# Patient Record
Sex: FEMALE
[De-identification: names, ages, dates, MRNs, and addresses within clinical notes are randomized per-mention and may not be internally consistent; named-entity substitution may affect disease eponyms.]

---

## 2019-04-23 ENCOUNTER — HOSPITAL ENCOUNTER (OUTPATIENT)
Dept: HOSPITAL 5 - SPVWC | Age: 74
Discharge: HOME | End: 2019-04-23
Attending: FAMILY MEDICINE
Payer: MEDICARE

## 2019-04-23 DIAGNOSIS — Z12.31: Primary | ICD-10-CM

## 2019-04-23 PROCEDURE — 77067 SCR MAMMO BI INCL CAD: CPT

## 2019-04-23 NOTE — MAMMOGRAPHY REPORT
BILATERAL DIGITAL SCREENING MAMMOGRAM with CAD: 04/23/19 13:30:00



CLINICAL: Routine screening.



COMPARISON: 10/17/13



FINDINGS: There are bilateral scattered areas of fibroglandular 

density.No mass, architectural distortion or suspicious calcifications.



IMPRESSION: No mammographic evidence of malignancy.



BI-RADS CATEGORY:  1 -- Negative



RECOMMENDATION: Routine mammographic screening in one year.



COMMENT:

Patient follow-up letters are generated by our [employer] application.

## 2021-01-05 ENCOUNTER — HOSPITAL ENCOUNTER (OUTPATIENT)
Dept: HOSPITAL 5 - SPVWC | Age: 76
Discharge: HOME | End: 2021-01-05
Attending: FAMILY MEDICINE
Payer: MEDICARE

## 2021-01-05 DIAGNOSIS — Z12.31: Primary | ICD-10-CM

## 2021-01-05 PROCEDURE — 77067 SCR MAMMO BI INCL CAD: CPT

## 2021-01-05 NOTE — MAMMOGRAPHY REPORT
DIGITAL SCREENING MAMMOGRAM WITH CAD, 1/5/2021



CLINICAL INFORMATION / INDICATION: Routine screening mammography. SCREENING MAMMOGRAM



TECHNIQUE:  Digital bilateral 2D mammography was obtained in the craniocaudal and mediolateral obliqu
e projections. This examination was interpreted with the benefit of Computer-Aided Detection analysis
.



COMPARISON: 10/17/2013 and 4/23/2019.



FINDINGS: 



Breast Density: There are scattered areas of fibroglandular density.



No dominant mass, suspicious calcifications, or architectural distortion in either breast. 



A small benign-appearing lymph node in the right axillary tail is stable.

 

IMPRESSION: No mammographic evidence of malignancy.



Follow up recommendation: Routine yearly



BI-RADS Category 2:  Benign.





-------------------------------------------------------------------------------------------

A "normal" or negative report should not discourage follow up or biopsy of a clinically significant f
inding.



A written summary of these findings will be mailed to the patient. The patient will be entered into a
 mammography reporting system which will generate a reminder letter for the patient's next appointmen
t at the appropriate interval.



The [employer] recommends yearly mammograms starting at age 40 and continuing as l
george as a woman is in good health.  Breast MRI is recommended for women with an approximate 20-25% or 
greater lifetime risk of breast cancer, including women with a strong family history of breast or ova
august cancer or who have been treated for Hodgkin's disease.



Signer Name: Saw Kennedy MD 

Signed: 1/5/2021 3:47 PM

Workstation Name: [email or web address]

## 2021-02-23 ENCOUNTER — HOSPITAL ENCOUNTER (OUTPATIENT)
Dept: HOSPITAL 5 - SPVWC | Age: 76
Discharge: HOME | End: 2021-02-23
Attending: FAMILY MEDICINE
Payer: MEDICARE

## 2021-02-23 DIAGNOSIS — K76.0: Primary | ICD-10-CM

## 2021-02-23 DIAGNOSIS — N28.89: ICD-10-CM

## 2021-02-23 PROCEDURE — 76700 US EXAM ABDOM COMPLETE: CPT

## 2021-02-23 NOTE — ULTRASOUND REPORT
ULTRASOUND ABDOMEN, COMPLETE



INDICATION / CLINICAL INFORMATION:

EPIGASTRIC PAIN R10.13.



COMPARISON:

None available.



FINDINGS:

PANCREAS: No significant abnormality.

ABDOMINAL AORTA: No significant abnormality.

IVC: No significant abnormality.

LIVER: Minimal diffuse increase in echogenicity throughout the hepatic parenchyma. No focal hepatic l
esions. 

GALLBLADDER: No significant abnormality. 

BILE DUCTS: No significant abnormality. Common bile duct measures 5 mm. 

KIDNEYS: Right: No significant abnormality.  Left: 1.8 cm area of hyperechoic abnormality inferior po
le left kidney best seen series 1 image 82.

SPLEEN: No significant abnormality.



FREE FLUID: None.

ADDITIONAL FINDINGS: None.



IMPRESSION:

1. Area of hyperechogenicity in the left inferior pole of the kidney. This area is not well-visualize
d on other images and may represent a focal renal lesion versus prominent appearing renal pelvic fat.
 Dedicated contrast-enhanced CT is recommended for further evaluation/characterization.

2. Diffuse hepatic steatosis.

3. No cholelithiasis or acute cholecystitis.



Signer Name: Luis Lawrence MD 

Signed: 2/23/2021 12:08 PM

Workstation Name: [id number]